# Patient Record
Sex: FEMALE | Race: WHITE | Employment: STUDENT | URBAN - METROPOLITAN AREA
[De-identification: names, ages, dates, MRNs, and addresses within clinical notes are randomized per-mention and may not be internally consistent; named-entity substitution may affect disease eponyms.]

---
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
## 2019-04-05 PROBLEM — Z78.9 VEGAN: Status: ACTIVE | Noted: 2019-04-05

## 2019-04-05 PROBLEM — G43.109 MIGRAINE WITH AURA AND WITHOUT STATUS MIGRAINOSUS, NOT INTRACTABLE: Status: ACTIVE | Noted: 2017-11-29

## 2019-08-19 PROCEDURE — 87081 CULTURE SCREEN ONLY: CPT | Performed by: FAMILY MEDICINE

## 2022-01-21 ENCOUNTER — APPOINTMENT (OUTPATIENT)
Dept: GENERAL RADIOLOGY | Age: 22
End: 2022-01-21
Attending: PHYSICIAN ASSISTANT
Payer: COMMERCIAL

## 2022-01-21 ENCOUNTER — HOSPITAL ENCOUNTER (EMERGENCY)
Age: 22
Discharge: HOME | End: 2022-01-21
Attending: EMERGENCY MEDICINE
Payer: COMMERCIAL

## 2022-01-21 VITALS
DIASTOLIC BLOOD PRESSURE: 66 MMHG | RESPIRATION RATE: 14 BRPM | BODY MASS INDEX: 21.47 KG/M2 | TEMPERATURE: 98 F | WEIGHT: 150 LBS | HEIGHT: 70 IN | OXYGEN SATURATION: 99 % | HEART RATE: 81 BPM | SYSTOLIC BLOOD PRESSURE: 101 MMHG

## 2022-01-21 DIAGNOSIS — S20.222A CONTUSION OF LEFT SIDE OF BACK, INITIAL ENCOUNTER: Primary | ICD-10-CM

## 2022-01-21 LAB
B-HCG UR QL: NEGATIVE
BILIRUB UR QL STRIP.AUTO: NEGATIVE
CLARITY UR REFRACT.AUTO: CLEAR
COLOR UR AUTO: YELLOW
GLUCOSE UR STRIP.AUTO-MCNC: NEGATIVE MG/DL
KETONES UR STRIP.AUTO-MCNC: NEGATIVE MG/DL
LEUKOCYTE ESTERASE UR QL STRIP.AUTO: NEGATIVE
NITRITE UR QL STRIP.AUTO: NEGATIVE
PH UR STRIP.AUTO: 7 [PH] (ref 5–8)
PROT UR STRIP.AUTO-MCNC: NEGATIVE MG/DL
SP GR UR STRIP.AUTO: 1.02 (ref 1–1.03)
UROBILINOGEN UR STRIP.AUTO-MCNC: 0.2 MG/DL

## 2022-01-21 PROCEDURE — 99284 EMERGENCY DEPT VISIT MOD MDM: CPT

## 2022-01-21 PROCEDURE — 71046 X-RAY EXAM CHEST 2 VIEWS: CPT | Performed by: PHYSICIAN ASSISTANT

## 2022-01-21 PROCEDURE — 72110 X-RAY EXAM L-2 SPINE 4/>VWS: CPT | Performed by: PHYSICIAN ASSISTANT

## 2022-01-21 PROCEDURE — 81025 URINE PREGNANCY TEST: CPT

## 2022-01-21 PROCEDURE — 81001 URINALYSIS AUTO W/SCOPE: CPT | Performed by: PHYSICIAN ASSISTANT

## 2022-01-21 PROCEDURE — 73502 X-RAY EXAM HIP UNI 2-3 VIEWS: CPT | Performed by: PHYSICIAN ASSISTANT

## 2022-01-21 RX ORDER — CYCLOBENZAPRINE HCL 5 MG
TABLET ORAL 3 TIMES DAILY PRN
Qty: 20 TABLET | Refills: 0 | Status: SHIPPED | OUTPATIENT
Start: 2022-01-21

## 2022-01-21 RX ORDER — IBUPROFEN 600 MG/1
600 TABLET ORAL EVERY 6 HOURS
Qty: 28 TABLET | Refills: 0 | Status: SHIPPED | OUTPATIENT
Start: 2022-01-21 | End: 2022-01-28

## 2022-01-21 NOTE — ED PROVIDER NOTES
Patient Seen in: [facility] In [place]      History   Patient presents with:  Trauma    Stated Complaint: fall, back pain     Subjective:   REMY Carpenterelsi Cade is a 47-year-old female who presents today for evaluation of pain to the left l SpO2 99%   BMI 21.52 kg/m²         Physical Exam    Nursing note reviewed. Vital signs reviewed.   General: A&O x 3; well-developed; well-nourished; no acute distress  Head: Normocephalic, atraumatic  Chest: Nontender, normal expansion  Cardio: RRR, no mur lumbar spine and left hip are ordered and are negative as below. XR CHEST PA + LAT CHEST (CPT=71046)  CONCLUSION:  No acute findings.     Dictated by (CST): Leola Collet, MD on 1/21/2022 at 6:32 PM     Finalized by (CST): Leola Collet, MD on 1/21/ Prescribed:  Discharge Medication List as of 1/21/2022  7:48 PM    START taking these medications    ibuprofen 600 MG Oral Tab  Take 1 tablet (600 mg total) by mouth every 6 (six) hours for 7 days. , Normal, Disp-28 tablet, R-0    cyclobenzaprine 5 MG Oral

## 2022-01-22 NOTE — ED QUICK NOTES
Pt called nursing station via call light requesting help to be brought out of ED. This RN brought wheelchair to patient in ED exam room. Pt stated that she was unable to stand.  Out of concern for patient, this RN spoke with patient asking if she would need

## 2022-01-22 NOTE — ED QUICK NOTES
Pt ambulated to bathroom with assist. Difficulty ambulating. Pt offered wheelchair several times but declined. PT states it would be harder to get in and out of the wheelchair than to just walk to bathroom.  Pt walked to bathroom and instructed to call when

## 2024-04-17 ENCOUNTER — NURSE TRIAGE (OUTPATIENT)
Dept: TELEHEALTH | Age: 24
End: 2024-04-17

## 2024-11-21 ENCOUNTER — HOSPITAL ENCOUNTER (OUTPATIENT)
Age: 24
Discharge: HOME | End: 2024-11-21

## 2024-11-21 VITALS
DIASTOLIC BLOOD PRESSURE: 61 MMHG | HEART RATE: 76 BPM | SYSTOLIC BLOOD PRESSURE: 114 MMHG | RESPIRATION RATE: 20 BRPM | OXYGEN SATURATION: 98 % | TEMPERATURE: 99 F

## 2024-11-21 DIAGNOSIS — R42 DIZZINESS: Primary | ICD-10-CM

## 2024-11-21 LAB
#MXD IC: 0.5 X10ˆ3/UL (ref 0.1–1)
B-HCG UR QL: NEGATIVE
BILIRUB UR QL STRIP: NEGATIVE
BUN BLD-MCNC: 10 MG/DL (ref 7–18)
CHLORIDE BLD-SCNC: 101 MMOL/L (ref 98–112)
CLARITY UR: CLEAR
CO2 BLD-SCNC: 28 MMOL/L (ref 21–32)
COLOR UR: YELLOW
CREAT BLD-MCNC: 0.7 MG/DL
EGFRCR SERPLBLD CKD-EPI 2021: 122 ML/MIN/1.73M2 (ref 60–?)
GLUCOSE BLD-MCNC: 79 MG/DL (ref 70–99)
GLUCOSE UR STRIP-MCNC: NEGATIVE MG/DL
HCT VFR BLD AUTO: 43.5 %
HCT VFR BLD CALC: 46 %
HGB BLD-MCNC: 14.1 G/DL
HGB UR QL STRIP: NEGATIVE
ISTAT IONIZED CALCIUM FOR CHEM 8: 1.18 MMOL/L (ref 1.12–1.32)
KETONES UR STRIP-MCNC: NEGATIVE MG/DL
LEUKOCYTE ESTERASE UR QL STRIP: NEGATIVE
LYMPHOCYTES # BLD AUTO: 3 X10ˆ3/UL (ref 1–4)
LYMPHOCYTES NFR BLD AUTO: 31.4 %
MCH RBC QN AUTO: 28.7 PG (ref 26–34)
MCHC RBC AUTO-ENTMCNC: 32.4 G/DL (ref 31–37)
MCV RBC AUTO: 88.4 FL (ref 80–100)
MIXED CELL %: 5.1 %
NEUTROPHILS # BLD AUTO: 5.9 X10ˆ3/UL (ref 1.5–7.7)
NEUTROPHILS NFR BLD AUTO: 63.5 %
NITRITE UR QL STRIP: NEGATIVE
PH UR STRIP: 7 [PH]
PLATELET # BLD AUTO: 270 X10ˆ3/UL (ref 150–450)
POTASSIUM BLD-SCNC: 4.1 MMOL/L (ref 3.6–5.1)
PROT UR STRIP-MCNC: NEGATIVE MG/DL
RBC # BLD AUTO: 4.92 X10ˆ6/UL
SODIUM BLD-SCNC: 140 MMOL/L (ref 136–145)
SP GR UR STRIP: 1.02
UROBILINOGEN UR STRIP-ACNC: <2 MG/DL
WBC # BLD AUTO: 9.4 X10ˆ3/UL (ref 4–11)

## 2024-11-21 RX ORDER — ESCITALOPRAM OXALATE 5 MG/1
5 TABLET ORAL DAILY
COMMUNITY
Start: 2024-09-30

## 2024-11-21 RX ORDER — MECLIZINE HYDROCHLORIDE 25 MG/1
25 TABLET ORAL 3 TIMES DAILY PRN
Qty: 20 TABLET | Refills: 0 | Status: SHIPPED | OUTPATIENT
Start: 2024-11-21

## 2024-11-21 RX ORDER — LORAZEPAM 0.5 MG/1
0.5 TABLET ORAL
COMMUNITY
Start: 2024-09-30

## 2024-11-21 RX ORDER — ONDANSETRON 4 MG/1
4 TABLET, ORALLY DISINTEGRATING ORAL EVERY 4 HOURS PRN
Qty: 10 TABLET | Refills: 0 | Status: SHIPPED | OUTPATIENT
Start: 2024-11-21 | End: 2024-11-28

## 2024-11-22 LAB
ATRIAL RATE: 59 BPM
P AXIS: 43 DEGREES
P-R INTERVAL: 138 MS
Q-T INTERVAL: 396 MS
QRS DURATION: 78 MS
QTC CALCULATION (BEZET): 392 MS
R AXIS: 7 DEGREES
T AXIS: 36 DEGREES
VENTRICULAR RATE: 59 BPM

## 2024-11-22 NOTE — ED PROVIDER NOTES
Patient Seen in: [facility]      History   No chief complaint on file.    Stated Complaint: Nausea    Subjective:   HPI  Patient is a 24-year-old female who presents to the [facility] care center with a concern for dizziness that has been persistent for 3 days.  She is experienced similar dizziness in the remote past, but has not had significant dizziness for almost 5 years.  She reports that the dizziness is provokable.  She finds this by saying it is definitely worse when she moves her head.  However, she does have dizziness while at rest.  She has had some nausea without vomiting or diarrhea.  She has been eating and drinking without difficulty.  She denies recent illness; denies all recent trauma.  She does recognize that she started taking Lexapro and lorazepam as prescribed by her psychiatrist 3 weeks ago, no other new medication change.            Objective:     Past Medical History:    Concussion    Headache disorder    migraines    Thrush              History reviewed. No pertinent surgical history.             Social History     Socioeconomic History    Marital status: Single   Tobacco Use    Smoking status: Never    Smokeless tobacco: Never   Vaping Use    Vaping status: Never Used   Substance and Sexual Activity    Alcohol use: No     Alcohol/week: 0.0 standard drinks of alcohol    Drug use: No    Sexual activity: Never     Social Drivers of Health     Food Insecurity: Food Insecurity Present (9/20/2024)    Received from [facility]    Food Insecurity     Currently or in the past 3 months, have you worried your food would run out before you had money to buy more?: Yes     In the past 12 months, have you run out of food or been unable to get more?: No   Transportation Needs: No Transportation Needs (9/20/2024)    Received from [facility]    Transportation Needs     Medical Transportation Needs?: No    Received from [facility]     Housing Stability              Review of Systems   Constitutional:  Negative for appetite change, chills and fever.   HENT:  Negative for congestion.    Respiratory:  Negative for cough and shortness of breath.    Cardiovascular:  Negative for chest pain and leg swelling.   Gastrointestinal:  Positive for nausea. Negative for diarrhea and vomiting.   Musculoskeletal:  Negative for neck pain.   Neurological:  Positive for dizziness. Negative for weakness, numbness and headaches.       Positive for stated complaint: Nausea  Other systems are as noted in HPI.  Constitutional and vital signs reviewed.      All other systems reviewed and negative except as noted above.    Physical Exam     ED Triage Vitals [11/21/24 1815]   /61   Pulse 76   Resp 20   Temp 98.6 °F (37 °C)   Temp src Oral   SpO2 98 %   O2 Device None (Room air)       Current Vitals:   Vital Signs  BP: 114/61  Pulse: 76  Resp: 20  Temp: 98.6 °F (37 °C)  Temp src: Oral    Oxygen Therapy  SpO2: 98 %  O2 Device: None (Room air)        Physical Exam  Vitals and nursing note reviewed.   Constitutional:       General: She is not in acute distress.     Appearance: She is not ill-appearing.   HENT:      Head: Normocephalic and atraumatic.      Nose: Nose normal.   Eyes:      Extraocular Movements: Extraocular movements intact.      Conjunctiva/sclera: Conjunctivae normal.      Pupils: Pupils are equal, round, and reactive to light.   Pulmonary:      Effort: Pulmonary effort is normal. No respiratory distress.   Musculoskeletal:      Cervical back: Normal range of motion and neck supple. No rigidity.   Skin:     General: Skin is warm and dry.   Neurological:      Mental Status: She is alert and oriented to person, place, and time.      Cranial Nerves: No dysarthria or facial asymmetry.      Sensory: Sensation is intact.      Motor: No weakness, tremor or pronator drift.      Coordination: Romberg sign negative. Coordination normal. Finger-Nose-Finger Test and  Heel to Shin Test normal.      Gait: Gait normal.   Psychiatric:         Behavior: Behavior normal.             ED Course     Results for orders placed or performed during the hospital encounter of 11/21/24   POCT CBC    Collection Time: 11/21/24  6:41 PM   Result Value Ref Range    WBC IC 9.4 4.0 - 11.0 x10ˆ3/uL    RBC IC 4.92 3.80 - 5.30 X10ˆ6/uL    HGB IC 14.1 12.0 - 16.0 g/dL    HCT IC 43.5 35.0 - 48.0 %    MCV IC 88.4 80.0 - 100.0 fL    MCH IC 28.7 26.0 - 34.0 pg    MCHC IC 32.4 31.0 - 37.0 g/dL    PLT .0 150.0 - 450.0 X10ˆ3/uL    # Neutrophil 5.9 1.5 - 7.7 X10ˆ3/uL    # Lymphocyte 3.0 1.0 - 4.0 X10ˆ3/uL    # Mixed Cells 0.5 0.1 - 1.0 X10ˆ3/uL    Neutrophil % 63.5 %    Lymphocyte % 31.4 %    Mixed Cell % 5.1 %   POCT Urinalysis Dipstick    Collection Time: 11/21/24  6:42 PM   Result Value Ref Range    Urine Color Yellow Yellow    Urine Clarity Clear Clear    Specific Gravity, Urine 1.020 1.005 - 1.030    PH, Urine 7.0 5.0 - 8.0    Protein urine Negative Negative mg/dL    Glucose, Urine Negative Negative mg/dL    Ketone, Urine Negative Negative mg/dL    Bilirubin, Urine Negative Negative    Blood, Urine Negative Negative    Nitrite Urine Negative Negative    Urobilinogen urine <2.0 <2.0 mg/dL    Leukocyte esterase urine Negative Negative   POCT Pregnancy, Urine    Collection Time: 11/21/24  6:43 PM   Result Value Ref Range    POCT Urine Pregnancy Negative Negative   EKG 12 Lead    Collection Time: 11/21/24  6:44 PM   Result Value Ref Range    Ventricular rate 59 BPM    Atrial rate 59 BPM    P-R Interval 138 ms    QRS Duration 78 ms    Q-T Interval 396 ms    QTC Calculation (Bezet) 392 ms    P Axis 43 degrees    R Axis 7 degrees    T Axis 36 degrees   POCT ISTAT chem8 cartridge    Collection Time: 11/21/24  6:47 PM   Result Value Ref Range    ISTAT Sodium 140 136 - 145 mmol/L    ISTAT BUN 10 7 - 18 mg/dL    ISTAT Potassium 4.1 3.6 - 5.1 mmol/L    ISTAT Chloride 101 98 - 112 mmol/L    ISTAT Ionized Calcium  1.18 1.12 - 1.32 mmol/L    ISTAT Hematocrit 46 34 - 50 %    ISTAT Glucose 79 70 - 99 mg/dL    ISTAT TCO2 28 21 - 32 mmol/L    ISTAT Creatinine 0.70 0.55 - 1.02 mg/dL    eGFR-Cr 122 >=60 mL/min/1.73m2       EKG    Rate, intervals and axes as noted on EKG Report.  Rate: 59  Rhythm: Sinus Rhythm  Reading: no ST or T-wave abnormality                       MDM      All laboratory and EKG findings were reviewed with patient at bedside prior to disposition.  She was reassured by this test results.  However, she was informed that definitive diagnosis has not been established.  It is suspect that she started having the symptoms after taking the Lexapro.  Lexapro could cause her to have dizziness.  She was asked to contact her psychiatrist tomorrow to adjust these symptoms after starting the Lexapro.  He was informed that her urine is somewhat concentrated.  As she has been having nausea, she is also informed we will prescription for an antiemetic so that she can push oral fluids at home.  She was also given prescription for meclizine.  She was informed that she should not take meclizine concurrently with her recently prescribed lorazepam.  She will hold the lorazepam while taking the meclizine.            Medical Decision Making  Differential diagnoses considered today include, but are not exclusive of: Anemia, vertigo; Ménière's disease; electrolyte imbalance; dehydration; cardiac dysrhythmia; acute coronary syndrome; subarachnoid hemorrhage; subdural hematoma; acute cerebrovascular accident; transient ischemic attack; neoplasm of the brain.      Problems Addressed:  Dizziness: undiagnosed new problem with uncertain prognosis    Amount and/or Complexity of Data Reviewed  Labs:  Decision-making details documented in ED Course.  ECG/medicine tests: independent interpretation performed. Decision-making details documented in ED Course.    Risk  Prescription drug management.        Disposition and Plan     Clinical  Impression:  1. Dizziness         Disposition:  Discharge  11/21/2024  7:05 pm    Follow-up:  Your psychitrist  Call tomorrow ... tell them you are having these symptoms after starting the lexipro.              Medications Prescribed:  Current Discharge Medication List        START taking these medications    Details   ondansetron 4 MG Oral Tablet Dispersible Take 1 tablet (4 mg total) by mouth every 4 (four) hours as needed for Nausea.  Qty: 10 tablet, Refills: 0      meclizine 25 MG Oral Tab Take 1 tablet (25 mg total) by mouth 3 (three) times daily as needed for Dizziness.  Qty: 20 tablet, Refills: 0                 Supplementary Documentation:

## 2024-11-22 NOTE — ED INITIAL ASSESSMENT (HPI)
Pt states Monday began having vertigo symptoms and nausea. Pt denies vomiting. Pt states having similar symptoms in the past. Pt states usually they have tied in with migraines.

## 2024-12-29 ENCOUNTER — TELEPHONE (OUTPATIENT)
Age: 24
End: 2024-12-29

## 2024-12-29 NOTE — TELEPHONE ENCOUNTER
Hello,     The [facility] Navigation team has attempted to reach you regarding an order from the [facility]. We are reaching out in order to assist you in coordinating care and resources that may meet your needs. Please give our office a call at [phone]. For more immediate assistance you can contact our 24-hour help line at [phone]. We look forward to hearing from you soon.

## 2025-01-06 ENCOUNTER — TELEPHONE (OUTPATIENT)
Age: 25
End: 2025-01-06

## 2025-01-06 NOTE — TELEPHONE ENCOUNTER
The [facility] Navigation team has attempted to reach you in order to follow up on an order that was placed by [facility]. Please give us a call back at [phone] to discuss care coordination and resources.

## 2025-01-15 PROBLEM — R45.851 SUICIDAL IDEATION: Status: ACTIVE | Noted: 2025-01-15

## 2025-01-15 PROBLEM — F33.2 SEVERE EPISODE OF RECURRENT MAJOR DEPRESSIVE DISORDER, WITHOUT PSYCHOTIC FEATURES (HCC): Status: ACTIVE | Noted: 2025-01-15

## 2025-01-15 PROBLEM — F41.0 GENERALIZED ANXIETY DISORDER WITH PANIC ATTACKS: Status: ACTIVE | Noted: 2025-01-15

## 2025-01-15 PROBLEM — F41.1 GENERALIZED ANXIETY DISORDER WITH PANIC ATTACKS: Status: ACTIVE | Noted: 2025-01-15

## 2025-03-06 ENCOUNTER — HOSPITAL ENCOUNTER (OUTPATIENT)
Age: 25
Discharge: HOME | End: 2025-03-06
Payer: MEDICAID

## 2025-03-06 VITALS
RESPIRATION RATE: 20 BRPM | SYSTOLIC BLOOD PRESSURE: 129 MMHG | DIASTOLIC BLOOD PRESSURE: 77 MMHG | TEMPERATURE: 98 F | HEART RATE: 72 BPM | OXYGEN SATURATION: 98 %

## 2025-03-06 DIAGNOSIS — H10.32 ACUTE BACTERIAL CONJUNCTIVITIS OF LEFT EYE: Primary | ICD-10-CM

## 2025-03-06 PROCEDURE — 99213 OFFICE O/P EST LOW 20 MIN: CPT | Performed by: NURSE PRACTITIONER

## 2025-03-06 RX ORDER — CIPROFLOXACIN HYDROCHLORIDE 3.5 MG/ML
SOLUTION/ DROPS TOPICAL
Qty: 10 ML | Refills: 0 | Status: SHIPPED | OUTPATIENT
Start: 2025-03-06 | End: 2025-03-13

## 2025-03-06 NOTE — ED INITIAL ASSESSMENT (HPI)
Pt here with reddened left eye with discharge that began 1 day ago , tp denies any pain , fevers or vision disturbance

## 2025-03-06 NOTE — ED PROVIDER NOTES
Patient Seen in: [facility]      History     Chief Complaint   Patient presents with    Eye Problem     Stated Complaint: left eye problem    Subjective:   23 y/o female with medical conditions as noted below presents with c/o left eye redness, discharge onset yesterday. States a few weeks ago had similar symptoms in right eye. Was watching her sister's children over the weekend, who recently was treated for conjunctivitis. Patient wears daily contacts but have not worn them past few days. + light sensitivity. No injury, visual changes,                   Objective:     Past Medical History:    Anxiety    Concussion    Depression    Headache disorder    migraines    Thrush              History reviewed. No pertinent surgical history.             Social History     Socioeconomic History    Marital status: Single   Tobacco Use    Smoking status: Never    Smokeless tobacco: Never   Vaping Use    Vaping status: Never Used   Substance and Sexual Activity    Alcohol use: No     Alcohol/week: 0.0 standard drinks of alcohol    Drug use: No    Sexual activity: Never     Social Drivers of Health     Food Insecurity: Food Insecurity Present (9/20/2024)    Received from [facility]    Food Insecurity     Currently or in the past 3 months, have you worried your food would run out before you had money to buy more?: Yes     In the past 12 months, have you run out of food or been unable to get more?: No   Transportation Needs: No Transportation Needs (9/20/2024)    Received from [facility]    Transportation Needs     Currently or in the past 3 months, has lack of transportation kept you from medical appointments, getting food or medicine, or providing care to a family member?: Unrecognized value     Medical Transportation Needs?: No    Received from [facility]    Housing Stability              Review of Systems   Constitutional:  Negative for chills and fever.    HENT:  Negative for congestion.    Eyes:  Positive for photophobia, discharge and redness. Negative for pain, itching and visual disturbance.   Respiratory:  Negative for cough.    Gastrointestinal:  Negative for nausea and vomiting.   Neurological:  Negative for headaches.   All other systems reviewed and are negative.      Positive for stated complaint: left eye problem  Other systems are as noted in HPI.  Constitutional and vital signs reviewed.      All other systems reviewed and negative except as noted above.    Physical Exam     ED Triage Vitals [03/06/25 1202]   /77   Pulse 72   Resp 20   Temp 98 °F (36.7 °C)   Temp src Oral   SpO2 98 %   O2 Device None (Room air)       Current Vitals:   Vital Signs  BP: 129/77  Pulse: 72  Resp: 20  Temp: 98 °F (36.7 °C)  Temp src: Oral    Oxygen Therapy  SpO2: 98 %  O2 Device: None (Room air)        Physical Exam  Vitals and nursing note reviewed.   Constitutional:       General: She is not in acute distress.     Appearance: Normal appearance. She is not ill-appearing.   HENT:      Head: Normocephalic.      Nose: Nose normal.      Mouth/Throat:      Mouth: Mucous membranes are moist.   Eyes:      General: Lids are normal. Vision grossly intact.         Left eye: Discharge present.     Extraocular Movements: Extraocular movements intact.      Conjunctiva/sclera:      Left eye: Left conjunctiva is injected.      Pupils: Pupils are equal, round, and reactive to light.   Cardiovascular:      Rate and Rhythm: Normal rate and regular rhythm.   Pulmonary:      Effort: Pulmonary effort is normal.      Breath sounds: Normal breath sounds.   Musculoskeletal:         General: Normal range of motion.   Skin:     General: Skin is warm and dry.      Capillary Refill: Capillary refill takes less than 2 seconds.   Neurological:      Mental Status: She is alert and oriented to person, place, and time.   Psychiatric:         Behavior: Behavior is cooperative.             ED Course    Labs Reviewed - No data to display                MDM              Medical Decision Making  Patient is well-appearing.  I discussed differentials with patient including but not limited to viral conjunctivitis vs allergic conjunctivitis vs bacterial conjunctivitis  Warm towel to clean lashes, good hand washing  RX Ciprofloxacin ophthalmic eye drop  Fu with PCP. Return/ ED precautions discussed      Problems Addressed:  Acute bacterial conjunctivitis of left eye: acute illness or injury    Risk  Prescription drug management.        Disposition and Plan     Clinical Impression:  1. Acute bacterial conjunctivitis of left eye         Disposition:  Discharge  3/6/2025 12:37 pm    Follow-up:  [facility] - [address] [address] [phone]              Medications Prescribed:  Discharge Medication List as of 3/6/2025 12:45 PM        START taking these medications    Details   ciprofloxacin 0.3 % Ophthalmic Solution Place 2 drops into the left eye every 4 (four) hours while awake for 2 days, THEN 1 drop every 6 (six) hours for 5 days., Normal, Disp-10 mL, R-0                 Supplementary Documentation:

## (undated) NOTE — LETTER
Date & Time: 11/21/2024, 7:05 PM  Patient: Leyda Ying  Encounter Provider(s):    Choco La APRN       To Whom It May Concern:    Leyda Ying was seen and treated in our department on 11/21/2024. She should not return to work until 11/25/24 .    If you have any questions or concerns, please do not hesitate to call.        _____________________________  Physician/APC Signature